# Patient Record
Sex: FEMALE
[De-identification: names, ages, dates, MRNs, and addresses within clinical notes are randomized per-mention and may not be internally consistent; named-entity substitution may affect disease eponyms.]

---

## 2019-01-18 ENCOUNTER — TRANSCRIPTION ENCOUNTER (OUTPATIENT)
Age: 39
End: 2019-01-18

## 2022-08-12 ENCOUNTER — NON-APPOINTMENT (OUTPATIENT)
Age: 42
End: 2022-08-12

## 2023-04-24 ENCOUNTER — NON-APPOINTMENT (OUTPATIENT)
Age: 43
End: 2023-04-24

## 2023-04-24 PROBLEM — Z00.00 ENCOUNTER FOR PREVENTIVE HEALTH EXAMINATION: Status: ACTIVE | Noted: 2023-04-24

## 2023-05-30 ENCOUNTER — NON-APPOINTMENT (OUTPATIENT)
Age: 43
End: 2023-05-30

## 2023-05-31 ENCOUNTER — NON-APPOINTMENT (OUTPATIENT)
Age: 43
End: 2023-05-31

## 2023-05-31 ENCOUNTER — APPOINTMENT (OUTPATIENT)
Dept: BREAST CENTER | Facility: CLINIC | Age: 43
End: 2023-05-31
Payer: COMMERCIAL

## 2023-05-31 VITALS
HEIGHT: 61 IN | BODY MASS INDEX: 28.7 KG/M2 | DIASTOLIC BLOOD PRESSURE: 77 MMHG | WEIGHT: 152 LBS | SYSTOLIC BLOOD PRESSURE: 126 MMHG | HEART RATE: 91 BPM

## 2023-05-31 DIAGNOSIS — Z78.9 OTHER SPECIFIED HEALTH STATUS: ICD-10-CM

## 2023-05-31 DIAGNOSIS — N64.4 MASTODYNIA: ICD-10-CM

## 2023-05-31 DIAGNOSIS — Z80.1 FAMILY HISTORY OF MALIGNANT NEOPLASM OF TRACHEA, BRONCHUS AND LUNG: ICD-10-CM

## 2023-05-31 PROCEDURE — 99203 OFFICE O/P NEW LOW 30 MIN: CPT

## 2023-05-31 NOTE — PAST MEDICAL HISTORY
[Menstruating] : The patient is menstruating [Menarche Age ____] : age at menarche was [unfilled] [Definite ___ (Date)] : the last menstrual period was [unfilled] [Regular Cycle Intervals] : have been regular [Total Preg ___] : G[unfilled] [Live Births ___] : P[unfilled]  [Age At Live Birth ___] : Age at live birth: [unfilled] [History of Hormone Replacement Treatment] : has no history of hormone replacement treatment [FreeTextEntry5] : c section [FreeTextEntry6] : no [FreeTextEntry7] : no [FreeTextEntry8] : yes

## 2023-05-31 NOTE — HISTORY OF PRESENT ILLNESS
[FreeTextEntry1] : Patient is a 44yo F who presents today for initial evaluation of L lateral breast discomfort. Patient states this is an ongoing intermittent sensation over the past 8 months. Patient went for f/u US 2/22/23 which yielded negative findings. Patient also stated she went for chest xray during this time yielding negative findings. She is an OLD/NEW with LOV prior to 2017. She has hx of L benign bx many years ago. Denies family history of breast or ovarian cancer. Patient denies palpable masses, skin changes, or nipple discharge bilaterally.\par \par DALJIT Lifetime Risk- 14.9%\par \par 10/25/18: B/l MG  (NYP)- heterogeneously dense, ESTIVEN. BIRADS 2.\par 2/3/20: B/l MG (NYP)- extremely dense, ESTIVEN. BIRADS 2\par 2/8/22: B/l US (NYP)- B/l subcentimeter cysts. BI-RADS 2\par 10/26/22: B/l MG & US (NYP)- extremely dense. US- B/l stable cysts. R 2.1cm oval hypoechoic mass 8:00 3FN (rec targeted US). BI-RADS 0\par 11/11/22: R US- R 8:00 3FN 1.9 cm oval parallel circumscribed isoechoic probably benign mass, may represent fat lobule (rec 6M f/u R US) BIRADS 3\par 2/22/23: B/l US (NYP)- R 1.4cm stable liliana circumscribed parallel isoechoic mass vs prominent lobule 8:00 3FN (rec 6M f/u at time of annual imaging). L ESTIVEN 3:00 9FN (area of pain). Rec f/u at time of annual imaging. BI-RADS 3\par

## 2023-05-31 NOTE — PHYSICAL EXAM
[Normocephalic] : normocephalic [EOMI] : extra ocular movement intact [Supple] : supple [No Supraclavicular Adenopathy] : no supraclavicular adenopathy [No Cervical Adenopathy] : no cervical adenopathy [de-identified] : Bilateral breast/axilla/supraclavicular area: No masses, discharge, or adenopathy\par

## 2024-11-11 PROBLEM — R92.30 DENSE BREASTS: Status: ACTIVE | Noted: 2024-11-11

## 2024-11-15 ENCOUNTER — APPOINTMENT (OUTPATIENT)
Dept: BREAST CENTER | Facility: CLINIC | Age: 44
End: 2024-11-15

## 2024-11-15 DIAGNOSIS — R92.30 DENSE BREASTS, UNSPECIFIED: ICD-10-CM
